# Patient Record
Sex: FEMALE | Race: WHITE | URBAN - METROPOLITAN AREA
[De-identification: names, ages, dates, MRNs, and addresses within clinical notes are randomized per-mention and may not be internally consistent; named-entity substitution may affect disease eponyms.]

---

## 2023-06-07 ENCOUNTER — OFFICE VISIT (OUTPATIENT)
Dept: URBAN - METROPOLITAN AREA CLINIC 76 | Facility: CLINIC | Age: 73
End: 2023-06-07
Payer: COMMERCIAL

## 2023-06-07 DIAGNOSIS — Z96.1 PRESENCE OF INTRAOCULAR LENS: ICD-10-CM

## 2023-06-07 DIAGNOSIS — H43.813 VITREOUS DEGENERATION, BILATERAL: ICD-10-CM

## 2023-06-07 DIAGNOSIS — H52.4 PRESBYOPIA: Primary | ICD-10-CM

## 2023-06-07 DIAGNOSIS — H17.89 OTHER CORNEAL SCARS AND OPACITIES: ICD-10-CM

## 2023-06-07 PROCEDURE — 92004 COMPRE OPH EXAM NEW PT 1/>: CPT | Performed by: OPTOMETRIST

## 2023-06-07 ASSESSMENT — VISUAL ACUITY
OD: 20/25
OS: 20/30

## 2023-06-07 ASSESSMENT — KERATOMETRY
OD: 45.38
OS: 46.75

## 2023-06-07 ASSESSMENT — INTRAOCULAR PRESSURE
OS: 12
OD: 27

## 2023-06-07 NOTE — IMPRESSION/PLAN
Impression: Presence of intraocular lens: Z96.1. Restor IOL OU. s/p YAG OU. Plan: Continue to monitor.

## 2023-06-07 NOTE — IMPRESSION/PLAN
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 Plan: Advised may be cause for limited vision. Continue to monitor.